# Patient Record
Sex: MALE | Race: WHITE | NOT HISPANIC OR LATINO | Employment: OTHER | URBAN - METROPOLITAN AREA
[De-identification: names, ages, dates, MRNs, and addresses within clinical notes are randomized per-mention and may not be internally consistent; named-entity substitution may affect disease eponyms.]

---

## 2021-03-03 DIAGNOSIS — Z23 NEED FOR VACCINATION: ICD-10-CM

## 2021-03-30 ENCOUNTER — HOSPITAL ENCOUNTER (OUTPATIENT)
Dept: LAB | Facility: MEDICAL CENTER | Age: 66
End: 2021-03-30
Payer: MEDICARE

## 2021-03-30 LAB — PSA SERPL-MCNC: <0.02 NG/ML (ref 0–4)

## 2021-03-30 PROCEDURE — 84153 ASSAY OF PSA TOTAL: CPT

## 2021-03-30 PROCEDURE — 36415 COLL VENOUS BLD VENIPUNCTURE: CPT

## 2022-03-07 ENCOUNTER — TELEPHONE (OUTPATIENT)
Dept: SCHEDULING | Facility: IMAGING CENTER | Age: 67
End: 2022-03-07

## 2022-03-16 ENCOUNTER — OFFICE VISIT (OUTPATIENT)
Dept: MEDICAL GROUP | Facility: PHYSICIAN GROUP | Age: 67
End: 2022-03-16
Payer: MEDICARE

## 2022-03-16 VITALS
WEIGHT: 212 LBS | HEART RATE: 83 BPM | HEIGHT: 68 IN | DIASTOLIC BLOOD PRESSURE: 76 MMHG | TEMPERATURE: 97.8 F | RESPIRATION RATE: 18 BRPM | SYSTOLIC BLOOD PRESSURE: 118 MMHG | BODY MASS INDEX: 32.13 KG/M2 | OXYGEN SATURATION: 97 %

## 2022-03-16 DIAGNOSIS — C61 PROSTATE CANCER (HCC): ICD-10-CM

## 2022-03-16 DIAGNOSIS — C61 CARCINOMA OF PROSTATE (HCC): ICD-10-CM

## 2022-03-16 DIAGNOSIS — E78.5 HYPERLIPIDEMIA, UNSPECIFIED HYPERLIPIDEMIA TYPE: ICD-10-CM

## 2022-03-16 DIAGNOSIS — Z00.00 HEALTH CARE MAINTENANCE: ICD-10-CM

## 2022-03-16 DIAGNOSIS — R10.31 RIGHT GROIN PAIN: ICD-10-CM

## 2022-03-16 DIAGNOSIS — E55.9 VITAMIN D DEFICIENCY: ICD-10-CM

## 2022-03-16 DIAGNOSIS — Z12.5 SCREENING FOR PROSTATE CANCER: ICD-10-CM

## 2022-03-16 DIAGNOSIS — K21.9 GASTROESOPHAGEAL REFLUX DISEASE, UNSPECIFIED WHETHER ESOPHAGITIS PRESENT: ICD-10-CM

## 2022-03-16 DIAGNOSIS — D61.82 MYELOPHTHISIC ANEMIA (HCC): ICD-10-CM

## 2022-03-16 DIAGNOSIS — C88.0 WALDENSTROM'S MACROGLOBULINEMIA (HCC): ICD-10-CM

## 2022-03-16 PROBLEM — Z51.12 ENCOUNTER FOR ANTINEOPLASTIC CHEMOTHERAPY AND IMMUNOTHERAPY: Status: ACTIVE | Noted: 2018-11-13

## 2022-03-16 PROBLEM — R00.2 PALPITATIONS: Status: ACTIVE | Noted: 2021-11-18

## 2022-03-16 PROBLEM — Z51.11 ENCOUNTER FOR ANTINEOPLASTIC CHEMOTHERAPY AND IMMUNOTHERAPY: Status: ACTIVE | Noted: 2018-11-13

## 2022-03-16 PROCEDURE — 99204 OFFICE O/P NEW MOD 45 MIN: CPT | Performed by: FAMILY MEDICINE

## 2022-03-16 RX ORDER — VALACYCLOVIR HYDROCHLORIDE 500 MG/1
TABLET, FILM COATED ORAL
COMMUNITY
Start: 2022-03-10 | End: 2024-03-22

## 2022-03-16 RX ORDER — DOXYCYCLINE HYCLATE 50 MG/1
50 TABLET, FILM COATED ORAL DAILY
COMMUNITY
End: 2022-04-27

## 2022-03-16 RX ORDER — IBRUTINIB 420 MG/1
TABLET, FILM COATED ORAL
COMMUNITY
Start: 2020-12-22

## 2022-03-16 RX ORDER — ESOMEPRAZOLE MAGNESIUM 20 MG/1
GRANULE, DELAYED RELEASE ORAL
COMMUNITY
End: 2024-03-22

## 2022-03-16 RX ORDER — CHOLECALCIFEROL (VITAMIN D3) 1250 MCG
CAPSULE ORAL
COMMUNITY
Start: 2022-02-07

## 2022-03-16 ASSESSMENT — PATIENT HEALTH QUESTIONNAIRE - PHQ9: CLINICAL INTERPRETATION OF PHQ2 SCORE: 0

## 2022-03-16 ASSESSMENT — ENCOUNTER SYMPTOMS
HEADACHES: 0
MYALGIAS: 0
VOMITING: 0
PALPITATIONS: 0
CHILLS: 0
FEVER: 0
BLURRED VISION: 0
COUGH: 0
DOUBLE VISION: 0
NAUSEA: 0
DIZZINESS: 0
SHORTNESS OF BREATH: 0
SORE THROAT: 0

## 2022-03-16 NOTE — ASSESSMENT & PLAN NOTE
S/p resection  Declined uro ref, states he monitors the PSA and would like to fax them to his uro in [place]    Dr sadaf quintana   [phone] fax

## 2022-03-16 NOTE — ASSESSMENT & PLAN NOTE
Chronic stable  On ibrutinib  Would like labs sent to Onc in [place]  Onc was dr bird in [place]   [address] [facility] pack   [phone]

## 2022-03-16 NOTE — LETTER
[facility]  Wally Hanson M.D.  [address]  [place] [address]  Fax: [phone]   Authorization for Release/Disclosure of   Protected Health Information   Name: ANABELL FORBES : 1955 SSN: [id number]   Address: [address] [place], [place] [address] [address] Phone:    [phone] (home) [phone] (work)   I authorize the entity listed below to release/disclose the PHI below to:   [facility]/Wally Hanson M.D. and Wally Hanson M.D.   Provider or Entity Name:     Address   City, State, Zip   Phone:      Fax:     Reason for request: continuity of care   Information to be released:    [  ] LAST COLONOSCOPY,  including any PATH REPORT and follow-up  [  ] LAST FIT/COLOGUARD RESULT [  ] LAST DEXA  [  ] LAST MAMMOGRAM  [  ] LAST PAP  [  ] LAST LABS [  ] RETINA EXAM REPORT  [  ] IMMUNIZATION RECORDS  [  ] Release all info      [  ] Check here and initial the line next to each item to release ALL health information INCLUDING  _____ Care and treatment for drug and / or alcohol abuse  _____ HIV testing, infection status, or AIDS  _____ Genetic Testing    DATES OF SERVICE OR TIME PERIOD TO BE DISCLOSED: _____________  I understand and acknowledge that:  * This Authorization may be revoked at any time by you in writing, except if your health information has already been used or disclosed.  * Your health information that will be used or disclosed as a result of you signing this authorization could be re-disclosed by the recipient. If this occurs, your re-disclosed health information may no longer be protected by State or Federal laws.  * You may refuse to sign this Authorization. Your refusal will not affect your ability to obtain treatment.  * This Authorization becomes effective upon signing and will  on (date) __________.      If no date is indicated, this Authorization will  one (1) year from the signature date.    Name: Anabell Forbes    Signature:   Date:     3/16/2022       PLEASE  FAX REQUESTED RECORDS BACK TO: [phone]

## 2022-03-16 NOTE — PROGRESS NOTES
Subjective:     CC:  Diagnoses of Waldenstrom's macroglobulinemia (HCC), Screening for prostate cancer, Health care maintenance, Vitamin D deficiency, Myelophthisic anemia (HCC), Prostate cancer (HCC), Carcinoma of prostate (HCC), Gastroesophageal reflux disease, unspecified whether esophagitis present, Hyperlipidemia, unspecified hyperlipidemia type, and Right groin pain were pertinent to this visit.    HISTORY OF THE PRESENT ILLNESS: Patient is a 66 y.o. male. This pleasant patient is here today to establish care and discuss     1) est care  Need records from prior providers  Onc was dr bird in [place]   [address] [facility] pack   [phone]    [facility]  Can also fax,  Dr sadaf quintana   [phone]    GP  Dr bland [phone] fax    2) waldenstroms on imbruvica    3) statin allergy    Ace allergy  PCN allergy    Using bactrim m/w/f  Taking doxycycline for facial rash    4) R groin/iliopsoas strain, 2 wks  Rides a deana,  No specific injury  Would like to monitor    Problem   Vitamin D Deficiency   Health Care Maintenance   Screening for Prostate Cancer   Right Groin Pain    2 wks  No specific injury  Hot pack tylenol stretching if still concerns rtc or sports med     Palpitations    Last Assessment & Plan:   Formatting of this note might be different from the original.  Intermittent palpitations over last 2 weeks.  Patient describes as being able to feel his pulse in his throat.  He does not clearly no irregularity to the pulse.  He is completely free of exertional symptoms.  EKG is acceptable  I do think he will require cardiology consultation for perhaps  ZIO monitor to rule out arrhythmia.  His symptoms do not sound anginal.  Patient given name of 3 cardiologist in the area     Prostate Cancer (Hcc)    Last Assessment & Plan:   Formatting of this note might be different from the original.  Patient is cancer free of this malignancy     Carcinoma of Prostate (Hcc)    Encounter for Antineoplastic Chemotherapy and Immunotherapy   Myelophthisic Anemia (Hcc)   Hyperviscosity   Neuropathy   Encounter for General Adult Medical Examination Without Abnormal Findings   Gastroesophageal Reflux Disease    Last Assessment & Plan:   Formatting of this note might be different from the original.  Patient requires Nexium only 1-2 days a week  Formatting of this note might be different from the original.  Last Assessment & Plan:    Patient doing well overall with only occasional GERD symptoms.  We will refill his PPI     Hyperlipidemia   Male Erectile Disorder   Waldenstrom's Macroglobulinemia (Hcc)    Last Assessment & Plan:   Formatting of this note might be different from the original.  Patient doing well on current treatment regimen per Oncology         Current Outpatient Medications Ordered in Epic   Medication Sig Dispense Refill   • Ibrutinib (IMBRUVICA) 420 MG Tab Imbruvica 420 mg tablet   TAKE 1 TABLET BY MOUTH ONCE DAILY     • valACYclovir (VALTREX) 500 MG Tab TAKE 1 TABLET BY MOUTH AS DIRECTED     • Doxycycline Hyclate 50 MG Tab Take 50 mg by mouth every day.     • Cholecalciferol (VITAMIN D3) 1.25 MG (08056 UT) Cap TAKE 1 CAPSULE BY MOUTH EVERY WEEK. INDICATIONS: VITAMIN DEFICIENCY     • Esomeprazole Magnesium 20 MG Pack Take  by mouth.       No current Epic-ordered facility-administered medications on file.     ROS:   Review of Systems   Constitutional: Negative for chills and fever.   HENT: Negative for ear pain and sore throat.    Eyes: Negative for blurred vision and double vision.   Respiratory: Negative for cough and shortness of breath.    Cardiovascular: Negative for chest pain and palpitations.   Gastrointestinal: Negative for nausea and vomiting.   Genitourinary: Negative for dysuria and hematuria.   Musculoskeletal: Positive for joint pain. Negative for myalgias.   Neurological: Negative for dizziness and headaches.         Objective:     Exam: /76 (BP Location:  "Left arm, Patient Position: Sitting, BP Cuff Size: Adult)   Pulse 83   Temp 36.6 °C (97.8 °F) (Temporal)   Resp 18   Ht 1.727 m (5' 8\")   Wt 96.2 kg (212 lb)   SpO2 97%  Body mass index is 32.23 kg/m².    Physical Exam  Constitutional:       General: He is not in acute distress.     Appearance: Normal appearance. He is obese. He is not ill-appearing, toxic-appearing or diaphoretic.   HENT:      Head: Normocephalic and atraumatic.   Eyes:      General: No scleral icterus.        Right eye: No discharge.      Extraocular Movements: Extraocular movements intact.      Conjunctiva/sclera: Conjunctivae normal.      Pupils: Pupils are equal, round, and reactive to light.   Cardiovascular:      Rate and Rhythm: Normal rate and regular rhythm.      Pulses: Normal pulses.      Heart sounds: Normal heart sounds. No murmur heard.  Pulmonary:      Effort: Pulmonary effort is normal. No respiratory distress.      Breath sounds: Normal breath sounds.   Abdominal:      General: There is no distension.      Tenderness: There is no abdominal tenderness.   Neurological:      Mental Status: He is alert.      Coordination: Coordination normal.      Gait: Gait normal.         Assessment & Plan:   66 y.o. male with the following -    Problem List Items Addressed This Visit     Carcinoma of prostate (HCC)     S/p resection  Declined uro ref, states he monitors the PSA and would like to fax them to his uro in [place]    Dr sadaf quintana   [phone] fax           Gastroesophageal reflux disease     Chronic stable  No refill needed         Relevant Medications    Esomeprazole Magnesium 20 MG Pack    Hyperlipidemia     Reports muscle aches/allergy to statins  States chol improved with wt loss         Myelophthisic anemia (HCC)    Prostate cancer (HCC)     S/p resection  F/u psa  F/u with uro          Waldenstrom's macroglobulinemia (HCC)     Chronic stable  On ibrutinib  Would like labs sent to Onc in [place]  Onc [place] dr bird [place] "   [address] [facility] pack   [phone]           Vitamin D deficiency     F/u results         Relevant Orders    VITAMIN D,25 HYDROXY    Health care maintenance     Est care  Onc, uro and pmd records needed           Relevant Orders    CBC WITH DIFFERENTIAL    Comp Metabolic Panel    Screening for prostate cancer     F/u results         Relevant Orders    PROSTATE SPECIFIC AG SCREENING    Right groin pain        Return in about 6 months (around 9/16/2022), or if symptoms worsen or fail to improve, for f/u labs.    Please note that this dictation was created using voice recognition software. I have made every reasonable attempt to correct obvious errors, but I expect that there are errors of grammar and possibly content that I did not discover before finalizing the note.

## 2022-03-18 ENCOUNTER — HOSPITAL ENCOUNTER (OUTPATIENT)
Dept: LAB | Facility: MEDICAL CENTER | Age: 67
End: 2022-03-18
Attending: FAMILY MEDICINE
Payer: MEDICARE

## 2022-03-18 DIAGNOSIS — E55.9 VITAMIN D DEFICIENCY: ICD-10-CM

## 2022-03-18 DIAGNOSIS — Z00.00 HEALTH CARE MAINTENANCE: ICD-10-CM

## 2022-03-18 DIAGNOSIS — Z12.5 SCREENING FOR PROSTATE CANCER: ICD-10-CM

## 2022-03-18 LAB
ALBUMIN SERPL BCP-MCNC: 4.5 G/DL (ref 3.2–4.9)
ALBUMIN/GLOB SERPL: 1.9 G/DL
ALP SERPL-CCNC: 67 U/L (ref 30–99)
ALT SERPL-CCNC: 16 U/L (ref 2–50)
ANION GAP SERPL CALC-SCNC: 12 MMOL/L (ref 7–16)
AST SERPL-CCNC: 13 U/L (ref 12–45)
BASOPHILS # BLD AUTO: 0.9 % (ref 0–1.8)
BASOPHILS # BLD: 0.06 K/UL (ref 0–0.12)
BILIRUB SERPL-MCNC: 0.5 MG/DL (ref 0.1–1.5)
BUN SERPL-MCNC: 26 MG/DL (ref 8–22)
CALCIUM SERPL-MCNC: 9.8 MG/DL (ref 8.5–10.5)
CHLORIDE SERPL-SCNC: 102 MMOL/L (ref 96–112)
CO2 SERPL-SCNC: 24 MMOL/L (ref 20–33)
CREAT SERPL-MCNC: 1.01 MG/DL (ref 0.5–1.4)
EOSINOPHIL # BLD AUTO: 0.09 K/UL (ref 0–0.51)
EOSINOPHIL NFR BLD: 1.4 % (ref 0–6.9)
ERYTHROCYTE [DISTWIDTH] IN BLOOD BY AUTOMATED COUNT: 49.1 FL (ref 35.9–50)
GFR SERPLBLD CREATININE-BSD FMLA CKD-EPI: 82 ML/MIN/1.73 M 2
GLOBULIN SER CALC-MCNC: 2.4 G/DL (ref 1.9–3.5)
GLUCOSE SERPL-MCNC: 85 MG/DL (ref 65–99)
HCT VFR BLD AUTO: 47 % (ref 42–52)
HGB BLD-MCNC: 15.3 G/DL (ref 14–18)
IMM GRANULOCYTES # BLD AUTO: 0.06 K/UL (ref 0–0.11)
IMM GRANULOCYTES NFR BLD AUTO: 0.9 % (ref 0–0.9)
LYMPHOCYTES # BLD AUTO: 1.6 K/UL (ref 1–4.8)
LYMPHOCYTES NFR BLD: 24.7 % (ref 22–41)
MCH RBC QN AUTO: 30.5 PG (ref 27–33)
MCHC RBC AUTO-ENTMCNC: 32.6 G/DL (ref 33.7–35.3)
MCV RBC AUTO: 93.8 FL (ref 81.4–97.8)
MONOCYTES # BLD AUTO: 0.6 K/UL (ref 0–0.85)
MONOCYTES NFR BLD AUTO: 9.3 % (ref 0–13.4)
NEUTROPHILS # BLD AUTO: 4.07 K/UL (ref 1.82–7.42)
NEUTROPHILS NFR BLD: 62.8 % (ref 44–72)
NRBC # BLD AUTO: 0 K/UL
NRBC BLD-RTO: 0 /100 WBC
PLATELET # BLD AUTO: 260 K/UL (ref 164–446)
PMV BLD AUTO: 11.8 FL (ref 9–12.9)
POTASSIUM SERPL-SCNC: 4.7 MMOL/L (ref 3.6–5.5)
PROT SERPL-MCNC: 6.9 G/DL (ref 6–8.2)
RBC # BLD AUTO: 5.01 M/UL (ref 4.7–6.1)
SODIUM SERPL-SCNC: 138 MMOL/L (ref 135–145)
WBC # BLD AUTO: 6.5 K/UL (ref 4.8–10.8)

## 2022-03-18 PROCEDURE — 85025 COMPLETE CBC W/AUTO DIFF WBC: CPT

## 2022-03-18 PROCEDURE — 80053 COMPREHEN METABOLIC PANEL: CPT

## 2022-03-18 PROCEDURE — 84153 ASSAY OF PSA TOTAL: CPT | Mod: GA

## 2022-03-18 PROCEDURE — 36415 COLL VENOUS BLD VENIPUNCTURE: CPT

## 2022-03-18 PROCEDURE — 82306 VITAMIN D 25 HYDROXY: CPT

## 2022-03-19 LAB
25(OH)D3 SERPL-MCNC: 67 NG/ML (ref 30–100)
PSA SERPL-MCNC: <0.02 NG/ML (ref 0–4)

## 2022-04-27 ENCOUNTER — OFFICE VISIT (OUTPATIENT)
Dept: MEDICAL GROUP | Facility: PHYSICIAN GROUP | Age: 67
End: 2022-04-27
Payer: MEDICARE

## 2022-04-27 VITALS
BODY MASS INDEX: 32.33 KG/M2 | HEART RATE: 75 BPM | WEIGHT: 213.3 LBS | HEIGHT: 68 IN | DIASTOLIC BLOOD PRESSURE: 70 MMHG | RESPIRATION RATE: 18 BRPM | TEMPERATURE: 97.5 F | OXYGEN SATURATION: 96 % | SYSTOLIC BLOOD PRESSURE: 130 MMHG

## 2022-04-27 DIAGNOSIS — M54.50 ACUTE LEFT-SIDED LOW BACK PAIN WITHOUT SCIATICA: ICD-10-CM

## 2022-04-27 PROCEDURE — 99213 OFFICE O/P EST LOW 20 MIN: CPT | Performed by: FAMILY MEDICINE

## 2022-04-27 RX ORDER — IBUPROFEN 400 MG/1
400 TABLET ORAL EVERY 6 HOURS PRN
Qty: 30 TABLET | Refills: 1 | Status: SHIPPED | OUTPATIENT
Start: 2022-04-27 | End: 2024-03-22

## 2022-04-27 RX ORDER — TIZANIDINE 4 MG/1
2 TABLET ORAL 2 TIMES DAILY
Qty: 30 TABLET | Refills: 1 | Status: SHIPPED | OUTPATIENT
Start: 2022-04-27 | End: 2024-03-22

## 2022-04-27 RX ORDER — SULFAMETHOXAZOLE AND TRIMETHOPRIM 800; 160 MG/1; MG/1
1 TABLET ORAL 2 TIMES DAILY
COMMUNITY

## 2022-04-27 ASSESSMENT — ENCOUNTER SYMPTOMS
VOMITING: 0
HEADACHES: 0
DIZZINESS: 0
DOUBLE VISION: 0
BLURRED VISION: 0
SORE THROAT: 0
COUGH: 0
MYALGIAS: 1
NAUSEA: 0
FEVER: 0
CHILLS: 0
BACK PAIN: 1
PALPITATIONS: 0
SHORTNESS OF BREATH: 0

## 2022-04-27 ASSESSMENT — FIBROSIS 4 INDEX: FIB4 SCORE: .825

## 2022-04-27 NOTE — PROGRESS NOTES
"Subjective:     CC: back muscle spasm    HPI:   Ghanshyam presents today with back spasm  L lower  No injury   x3 days  Has been very active at  Home  But not a specific pop  No urine or neuro sx  No para spinal pain, all L low back  Has to hunch forward and assist to sit up    Using otc    Problem   Acute Left-Sided Low Back Pain Without Sciatica       Current Outpatient Medications Ordered in Epic   Medication Sig Dispense Refill   • sulfamethoxazole-trimethoprim (BACTRIM DS) 800-160 MG tablet Take 1 Tablet by mouth 2 times a day.     • ibuprofen (MOTRIN) 400 MG Tab Take 1 Tablet by mouth every 6 hours as needed for Moderate Pain. 30 Tablet 1   • tizanidine (ZANAFLEX) 4 MG Tab Take 0.5 Tablets by mouth 2 times a day. 30 Tablet 1   • Ibrutinib (IMBRUVICA) 420 MG Tab Imbruvica 420 mg tablet   TAKE 1 TABLET BY MOUTH ONCE DAILY     • valACYclovir (VALTREX) 500 MG Tab TAKE 1 TABLET BY MOUTH AS DIRECTED     • Cholecalciferol (VITAMIN D3) 1.25 MG (20763 UT) Cap TAKE 1 CAPSULE BY MOUTH EVERY WEEK. INDICATIONS: VITAMIN DEFICIENCY     • Esomeprazole Magnesium 20 MG Pack Take  by mouth.       No current Epic-ordered facility-administered medications on file.     ROS:  Review of Systems   Constitutional: Negative for chills and fever.   HENT: Negative for ear pain and sore throat.    Eyes: Negative for blurred vision and double vision.   Respiratory: Negative for cough and shortness of breath.    Cardiovascular: Negative for chest pain and palpitations.   Gastrointestinal: Negative for nausea and vomiting.   Genitourinary: Negative for dysuria and hematuria.   Musculoskeletal: Positive for back pain and myalgias.   Neurological: Negative for dizziness and headaches.       Objective:     Exam:  /70 (BP Location: Left arm, Patient Position: Sitting, BP Cuff Size: Adult)   Pulse 75   Temp 36.4 °C (97.5 °F) (Temporal)   Resp 18   Ht 1.727 m (5' 8\")   Wt 96.8 kg (213 lb 4.8 oz)   SpO2 96%   BMI 32.43 kg/m²  Body mass index " is 32.43 kg/m².    Physical Exam  Constitutional:       General: He is in acute distress.      Appearance: Normal appearance. He is not ill-appearing, toxic-appearing or diaphoretic.   HENT:      Head: Normocephalic and atraumatic.   Musculoskeletal:         General: Tenderness and deformity present. No swelling or signs of injury.      Comments: No paraspinal pain,  Tender to ROM and palptation L low back msk  Hunched forward position    Neurological:      Mental Status: He is alert.         Assessment & Plan:     66 y.o. male with the following -     Problem List Items Addressed This Visit     Acute left-sided low back pain without sciatica     New  Trial of nsaids with good hydration and good  Or use tylenol  Discussed ADRs of msk relaxant  Hot pack, gentle stretch, walking, epsom salt bath, water massage or massage gun  If not resolved  Xray  Monday  F/u with PMR if still not resolved  Pt traveling in 2 wks          Relevant Medications    ibuprofen (MOTRIN) 400 MG Tab    tizanidine (ZANAFLEX) 4 MG Tab    Other Relevant Orders    Referral to Pain Clinic        Return in about 1 week (around 5/4/2022).    Please note that this dictation was created using voice recognition software. I have made every reasonable attempt to correct obvious errors, but I expect that there are errors of grammar and possibly content that I did not discover before finalizing the note.

## 2022-04-27 NOTE — ASSESSMENT & PLAN NOTE
New  Trial of nsaids with good hydration and good  Or use tylenol  Discussed ADRs of msk relaxant  Hot pack, gentle stretch, walking, epsom salt bath, water massage or massage gun  If not resolved  Xray  Monday  F/u with PMR if still not resolved  Pt traveling in 2 wks

## 2022-11-10 ENCOUNTER — PATIENT MESSAGE (OUTPATIENT)
Dept: HEALTH INFORMATION MANAGEMENT | Facility: OTHER | Age: 67
End: 2022-11-10

## 2023-04-17 ENCOUNTER — TELEPHONE (OUTPATIENT)
Dept: HEALTH INFORMATION MANAGEMENT | Facility: OTHER | Age: 68
End: 2023-04-17
Payer: MEDICARE

## 2023-11-15 ENCOUNTER — TELEPHONE (OUTPATIENT)
Dept: HEALTH INFORMATION MANAGEMENT | Facility: OTHER | Age: 68
End: 2023-11-15
Payer: MEDICARE

## 2024-02-27 NOTE — OP THERAPY EVALUATION
Outpatient Physical Therapy  INITIAL NEUROLOGICAL EVALUATION    [facility] [address]  [address]  [place] [address]  Phone:  [phone]  Fax:  [phone]    Date of Evaluation: 2024    Patient: Ghanshyam Forbes  YOB: 1955  MRN: [id number]     Referring Provider: No referring provider defined for this encounter.   Referring Diagnosis Cerebral infarction, unspecified [I63.9];Ataxia, unspecified [R27.0]     Time Calculation    Start time: 1330  Stop time: 1412 Time Calculation (min): 42 minutes             Chief Complaint: Weakness, Difficulty Walking, and Loss Of Balance    Visit Diagnoses     ICD-10-CM   1. Cerebrovascular accident (CVA), unspecified mechanism (HCC)  I63.9   2. Ataxia  R27.0   3. Loss of balance  R26.89       Subjective:   History of Present Illness:     Mechanism of injury:  CVA 23 with inpatient rehab stay      Pt reports stil feeling like he wants to work more on improving his gait/balance. He has to focus a lot on knee ext in stance and notes worse foot drop with fatigue. Feels like he swerves when walking. Downhill on gravel or gravel in general feels he needs to be more aware/careful. Finds himself focusing on gait, counting steps, and he would like walking to be more natural in nature.     Is able to get off the floor.    Everyonce in a while he experiences muscle cramp ie when yawning or standing up.    Last year had Rt TERESITA, planning on left one in the future but currently limited by left hip pain as it has been postponed. Also has low back pain in the morning, improves once up.     Would like to return to riding a motor cycle, skiing (feels limited walking in boots 5# each), picking up left foot in a ski,   Quality of life:  Good  Sleep disturbance:  Not disrupted  Pain:     Current pain ratin  Patient Goals:     Patient goals for therapy:  Increased strength and improved balance      No past medical history on  file.  No past surgical history on file.  Social History     Tobacco Use    Smoking status: Never    Smokeless tobacco: Never   Substance Use Topics    Alcohol use: Not on file     Family and Occupational History     Socioeconomic History    Marital status:      Spouse name: Not on file    Number of children: Not on file    Years of education: Not on file    Highest education level: Not on file   Occupational History    Not on file       Objective:     Observational Gait     Additional Observational Gait Details  During head turns/EC demo in FGA, after Minibest demod sig trendelenburg gait and mod left deviation-likely endurance component     STS when under indirect observation x 4 demod Rt bias in STS with LE positioning.     Balance/Gait Comments   10MWT 8.44  Speed 1.2m/s    MiniBest Test    Anticipatory  STS:2  Rise to Toes:2  SLS: Left 1. 2. 6 Right. 1. 2. 10 score 1  Subscore  5/6    Reactive Postural Control  Compensatory Stepping Correction-Forward:2  Compensatory Stepping Correction-Backwards:2  Compensatory Stepping Correction-Lateral: Left: 2 Right: 2  Subscore 6/6    Sensory Orientation:  Stance (feet together) Eyes open, Firm surface:2  Stance (feet together) Eyes closed, Foam Surface:1  Incline -eyes closed:2  Subscore  5/6    Dynamic Gait  Changes in gait speed:2  Walk with head turns-horizontal:1  Walk with pivot turns:2  Step over obstacles:2  TU.79  Tug CO.12 2    Subscore 9 /10    25/28    Less than 21 indicates increased risk of falling       [facility]  Gait Level surface 2 (6.12)  Change in gait speed 3  Gait with horizontal head turns2  Gait with vertical head turns 3  Gait and pivot turn 3  Step over obstacle 3  Gait with Narrow RADHA 3  Gait EC 1  Ambulating backwards 2  Stairs 2    Total 24/30    Scoring  3. Normal  2. Mild impairment  Moderate impairment   0. Severe Impairment         Therapeutic Exercises (CPT 16487):     1. SL bridge, x 10 B    2. sidelying hip abduction/ext holds,  3 x 1 min      Therapeutic Exercise Summary: Education on POC, findings including type 1 vs type 2 fibers as possible contributions to his complaints of feeling unstable. Cued to focus on STS foot positioning as he consistently bias Rt LE.       Time-based treatments/modalities:    Physical Therapy Timed Treatment Charges  Therapeutic exercise minutes (CPT 25624): 10 minutes      Assessment, Response and Plan:   Impairments: abnormal gait, impaired balance and impaired physical strength    Assessment details:  Ghanshyam presents for PT evaluation with chief complaint of instability with head turns and uneven surfaces as well as gait deviations. Pt scored well on both Mini Best and FGA with some difficulty noted with head turns however were worse with fatigue and much more prominent trendelenburg patter which pt reported a sensation of off balance. Pt would also like to improve automatic qualities of gait as he feels he has to constantly focus on this. He will benefit from skilled PT 1-2x per week x 8 weeks to improve above deficits and maximize function.  Prognosis: good    Goals:   Short Term Goals:   1. Pt will be compliant with HEP for improving strength, endurance and balance 3-5x per week.  2. Pt will demonstrate ability to perform Lt SL balance x 15 seconds or better.  Short term goal time span:  2-4 weeks      Long Term Goals:    1. Pt will demonstrate improved gait pattern with dec attention through use of litegait BWS walking on treadmill performing multiple cog tasks, stepping over items etc x 5 min without LOB.  2. Pt will self report improved gait mechanics with 50% improved automatic gait.   3. Pt will score normal on eyes closed gait on FGA.  Long term goal time span:  6-8 weeks    Plan:   Therapy options:  Physical therapy treatment to continue  Planned therapy interventions:  Neuromuscular Re-education (CPT 86626), Gait Training (CPT 94708), Therapeutic Activities (CPT 05186) and Therapeutic Exercise (CPT  02000)  Frequency:  2x week  Duration in weeks:  8  Discussed with:  Patient  Plan details:  Litegait, stepping over items/reactive gait      Functional Assessment Used      10MWT, Mini Best FGA    Referring provider co-signature:  I have reviewed this plan of care and my co-signature certifies the need for services.    Certification Period: 02/28/2024 to  04/28/24    Physician Signature: ________________________________ Date: ______________

## 2024-02-28 ENCOUNTER — PHYSICAL THERAPY (OUTPATIENT)
Dept: PHYSICAL THERAPY | Facility: REHABILITATION | Age: 69
End: 2024-02-28
Attending: FAMILY MEDICINE
Payer: MEDICARE

## 2024-02-28 DIAGNOSIS — R26.89 LOSS OF BALANCE: ICD-10-CM

## 2024-02-28 DIAGNOSIS — I63.9 CEREBROVASCULAR ACCIDENT (CVA), UNSPECIFIED MECHANISM (HCC): ICD-10-CM

## 2024-02-28 DIAGNOSIS — R27.0 ATAXIA: ICD-10-CM

## 2024-02-28 PROCEDURE — 97162 PT EVAL MOD COMPLEX 30 MIN: CPT

## 2024-02-28 PROCEDURE — 97110 THERAPEUTIC EXERCISES: CPT

## 2024-02-28 SDOH — ECONOMIC STABILITY: GENERAL: QUALITY OF LIFE: GOOD

## 2024-02-28 ASSESSMENT — ACTIVITIES OF DAILY LIVING (ADL): POOR_BALANCE: 1

## 2024-02-28 ASSESSMENT — ENCOUNTER SYMPTOMS: PAIN SCALE: 0

## 2024-03-04 ENCOUNTER — PHYSICAL THERAPY (OUTPATIENT)
Dept: PHYSICAL THERAPY | Facility: REHABILITATION | Age: 69
End: 2024-03-04
Payer: MEDICARE

## 2024-03-04 DIAGNOSIS — I63.9 CEREBROVASCULAR ACCIDENT (CVA), UNSPECIFIED MECHANISM (HCC): ICD-10-CM

## 2024-03-04 DIAGNOSIS — R26.89 LOSS OF BALANCE: ICD-10-CM

## 2024-03-04 PROCEDURE — 97110 THERAPEUTIC EXERCISES: CPT

## 2024-03-04 PROCEDURE — 97112 NEUROMUSCULAR REEDUCATION: CPT

## 2024-03-04 NOTE — OP THERAPY DAILY TREATMENT
Outpatient Physical Therapy  DAILY TREATMENT     [facility] [address].  [address]  [place] [address]  Phone:  [phone]  Fax:  [phone]    Date: 03/04/2024    Patient: Ghanshyam Forbes  YOB: 1955  MRN: [id number]     Time Calculation    Start time: 1000  Stop time: 1043 Time Calculation (min): 43 minutes         Chief Complaint: Loss Of Balance and Weakness    Visit #: 2    SUBJECTIVE:  Sore from shoveling snow    OBJECTIVE:  Current objective measures: minibest 25/28  FGA 24/30          Therapeutic Exercises (CPT 59186):     1. staggard STS, x 10 Lt LE bias    2. quadruped/forearm fire hydrant hold, 3 x 1 min B, education on isometric holds for endu    20. 2/28-4/28      Therapeutic Exercise Summary: HEP: SL bridge 3 x 10, Sidelying hip ab/ext 3 x 1 min    Staggard STS 3 x 10    Therapeutic Treatments and Modalities:     1. Neuromuscular Re-education (CPT 14804)    Therapeutic Treatment and Modalities Summary: Balance HEP  Educated to perform in front of counter top for UE safety  Performed x 1 min each bilateral  Tandem: horizontal head turns, vertical head turns, EC    Single limb stance horizontal and vertical head turns    Time-based treatments/modalities:    Physical Therapy Timed Treatment Charges  Neuromusc re-ed, balance, coor, post minutes (CPT 01319): 15 minutes  Therapeutic exercise minutes (CPT 72648): 28 minutes          ASSESSMENT:   Response to treatment: Progressed HEP for further core and hip isometrics for endurance/decreasing trendelenburg. Provided hand out which included balance portion    PLAN/RECOMMENDATIONS: litegait  Plan for treatment: therapy treatment to continue next visit.  Planned interventions for next visit: continue with current treatment.

## 2024-03-05 ENCOUNTER — HOSPITAL ENCOUNTER (OUTPATIENT)
Facility: MEDICAL CENTER | Age: 69
End: 2024-03-05
Attending: INTERNAL MEDICINE
Payer: MEDICARE

## 2024-03-05 PROCEDURE — 83521 IG LIGHT CHAINS FREE EACH: CPT

## 2024-03-05 PROCEDURE — 84155 ASSAY OF PROTEIN SERUM: CPT

## 2024-03-05 PROCEDURE — 82784 ASSAY IGA/IGD/IGG/IGM EACH: CPT

## 2024-03-05 PROCEDURE — 85810 BLOOD VISCOSITY EXAMINATION: CPT

## 2024-03-05 PROCEDURE — 86334 IMMUNOFIX E-PHORESIS SERUM: CPT

## 2024-03-05 PROCEDURE — 84165 PROTEIN E-PHORESIS SERUM: CPT

## 2024-03-06 ENCOUNTER — APPOINTMENT (OUTPATIENT)
Dept: PHYSICAL THERAPY | Facility: REHABILITATION | Age: 69
End: 2024-03-06
Payer: MEDICARE

## 2024-03-08 ENCOUNTER — APPOINTMENT (OUTPATIENT)
Dept: PHYSICAL THERAPY | Facility: REHABILITATION | Age: 69
End: 2024-03-08
Payer: MEDICARE

## 2024-03-08 LAB
ALBUMIN SERPL ELPH-MCNC: 4.27 G/DL (ref 3.75–5.01)
ALPHA1 GLOB SERPL ELPH-MCNC: 0.26 G/DL (ref 0.19–0.46)
ALPHA2 GLOB SERPL ELPH-MCNC: 0.8 G/DL (ref 0.48–1.05)
B-GLOBULIN SERPL ELPH-MCNC: 0.61 G/DL (ref 0.48–1.1)
EER MONOCLONAL PROTEIN AND FLC, SERUM Q5224: ABNORMAL
GAMMA GLOB SERPL ELPH-MCNC: 2.16 G/DL (ref 0.62–1.51)
IGA SERPL-MCNC: 15 MG/DL (ref 68–408)
IGG SERPL-MCNC: 147 MG/DL (ref 768–1632)
IGM SERPL-MCNC: 3054 MG/DL (ref 35–263)
INTERPRETATION SERPL IFE-IMP: ABNORMAL
INTERPRETATION SERPL IFE-IMP: ABNORMAL
KAPPA LC FREE SER-MCNC: 5.27 MG/L (ref 3.3–19.4)
KAPPA LC FREE/LAMBDA FREE SER NEPH: 1.27 {RATIO} (ref 0.26–1.65)
LAMBDA LC FREE SERPL-MCNC: 4.16 MG/L (ref 5.71–26.3)
MONOCLONAL PROTEIN NL11656: 2.03 G/DL
PROT SERPL-MCNC: 8.1 G/DL (ref 6.3–8.2)

## 2024-03-09 LAB — VISC SER: 1.54 CP

## 2024-03-11 ENCOUNTER — PHYSICAL THERAPY (OUTPATIENT)
Dept: PHYSICAL THERAPY | Facility: REHABILITATION | Age: 69
End: 2024-03-11
Payer: MEDICARE

## 2024-03-11 DIAGNOSIS — R26.89 LOSS OF BALANCE: ICD-10-CM

## 2024-03-11 DIAGNOSIS — I63.9 CEREBROVASCULAR ACCIDENT (CVA), UNSPECIFIED MECHANISM (HCC): ICD-10-CM

## 2024-03-11 PROCEDURE — 97112 NEUROMUSCULAR REEDUCATION: CPT

## 2024-03-11 PROCEDURE — 97110 THERAPEUTIC EXERCISES: CPT

## 2024-03-11 NOTE — OP THERAPY DAILY TREATMENT
Outpatient Physical Therapy  DAILY TREATMENT     [address]  [address]  [place] [address]  Phone:  [phone]  Fax:  [phone]    Date: 03/11/2024    Patient: Ghanshyam Forbes  YOB: 1955  MRN: [id number]     Time Calculation    Start time: 1327  Stop time: 1415 Time Calculation (min): 48 minutes         Chief Complaint: Loss Of Balance and Difficulty Walking    Visit #: 3    SUBJECTIVE:  Pt reports L hip painful in am and after walking ~ 1mile.  Rib seems to be improving.  Doing balance exercises daily.  Reports SLS with eyes closed is very difficult.    OBJECTIVE:        Therapeutic Exercises (CPT 23450):     1. staggered STS, x10 B, with Airex under front foot x10B.    2. quadruped fire hydrant hold, 2 x 1 min B, using handles for wrist neutral position    4. standing hip circles on disc glider, x10B CW/CCW    6. quadruped hip ext pulses, bent knee x30 sec B, straight leg x30 sec B    7. bird dog, x10    20. 2/28-4/28    Therapeutic Treatments and Modalities:     1. Neuromuscular Re-education (CPT 62377)    Therapeutic Treatment and Modalities Summary: Modified SLS with opp foot on Airex, arms front, over and out x30 sec B.  Step taps alt R/L to Airex x1 min.  Clock taps at 10, 12, and 2, x8 CW/CCW B. One LOB required stepping strategy on L stance limb.  Swing through to dynadiscs, heel and toe tap x10B  Gait on tape: zig zag lateral and forward and backward 2x 10 feet all.  Gait lateral steps 2x20 feet.  Gait with exaggerated heel strike 3x 30 feet, retro gait with exaggerated toe toe-heel rocker, 3 x 30 feet.  AP weight shift in stride stance on Airex x30 sec B.  Required one use of UEs for balance.  AP weight shift in stride stance on floor, EC, x10B.      Time-based treatments/modalities:    Physical Therapy Timed Treatment Charges  Neuromusc re-ed, balance, coor, post minutes (CPT 56319): 28 minutes  Therapeutic exercise minutes (CPT  04151): 20 minutes      ASSESSMENT:   Response to treatment: Pt demo good balance strategies and reactions with dynamic balance tasks.  Increased sway and hesitation with eyes closed.  Pt will benefit from continued skilled PT services to improve dynamic balance and LE functional strength for return to leisure activities.    PLAN/RECOMMENDATIONS:   Plan for treatment: therapy treatment to continue next visit.  Planned interventions for next visit: continue with current treatment.

## 2024-03-20 ENCOUNTER — PHYSICAL THERAPY (OUTPATIENT)
Dept: PHYSICAL THERAPY | Facility: REHABILITATION | Age: 69
End: 2024-03-20
Payer: MEDICARE

## 2024-03-20 DIAGNOSIS — I63.9 CEREBROVASCULAR ACCIDENT (CVA), UNSPECIFIED MECHANISM (HCC): ICD-10-CM

## 2024-03-20 DIAGNOSIS — R26.89 LOSS OF BALANCE: ICD-10-CM

## 2024-03-20 PROCEDURE — 97112 NEUROMUSCULAR REEDUCATION: CPT

## 2024-03-20 NOTE — OP THERAPY DAILY TREATMENT
Outpatient Physical Therapy  DAILY TREATMENT     [address]  [address]  [place] [address]  Phone:  [phone]  Fax:  [phone]    Date: 03/20/2024    Patient: Ghanshyam Forbes  YOB: 1955  MRN: [id number]     Time Calculation    Start time: 1130  Stop time: 1211 Time Calculation (min): 41 minutes         Chief Complaint: Loss Of Balance and Difficulty Walking    Visit #: 4    SUBJECTIVE:  Continues to feel miprovement    OBJECTIVE:        Therapeutic Exercises (CPT 29113):     20. 2/28-4/28    Therapeutic Treatments and Modalities:     1. Neuromuscular Re-education (CPT 75528)    Therapeutic Treatment and Modalities Summary: BWSTT on litegait harness for safety to perform dual tasking with high intensity gait training   Incline 5, 2.0mph performing sustain horizontal head turns and vertical head turns x 4 min each while holding conversation., First 3-5 attempts resulting in mod LOB self corrected without use of harness most often left turn  Progressed to incorporate reactive stepping stepping over randomly thrown bean bags on treadmill x 5 min while having conversation 4 mod Lob self corrected  Downward ambulation 5 incline performing ball toss weighted 3# with therapist inside and outside RADHA 1 max A with harness support, 3 mod A self corrected x 5 min  Progressed to self juggle x 2 min lateral, x 2 min vertical    Standing bosu ball ball side down vert/horiiz head turns x 1 min each    Tall kneeling on bosu ball side up toe support x 2 min, progressed to attempts without toe support. 10-15 reps horiz and vert head turns    1/2 kneeling on BOSU toe touch a x 1 min each, no touch a x 1 min each bilateral      Time-based treatments/modalities:    Physical Therapy Timed Treatment Charges  Neuromusc re-ed, balance, coor, post minutes (CPT 68924): 41 minutes      ASSESSMENT:   Response to treatment: Continued to educate on functional balance training  what home ie walking the dog while performing head turns, gait with ball toss, etc. Demod good self corrections during high intensity training without severe LOB. Encouraged OT balance training at gym    PLAN/RECOMMENDATIONS:   Plan for treatment: therapy treatment to continue next visit.  Planned interventions for next visit: continue with current treatment.

## 2024-03-22 ENCOUNTER — OFFICE VISIT (OUTPATIENT)
Dept: MEDICAL GROUP | Facility: PHYSICIAN GROUP | Age: 69
End: 2024-03-22
Payer: MEDICARE

## 2024-03-22 VITALS
HEIGHT: 68 IN | BODY MASS INDEX: 31.58 KG/M2 | DIASTOLIC BLOOD PRESSURE: 70 MMHG | WEIGHT: 208.4 LBS | SYSTOLIC BLOOD PRESSURE: 126 MMHG | TEMPERATURE: 98.4 F | HEART RATE: 77 BPM | OXYGEN SATURATION: 97 %

## 2024-03-22 DIAGNOSIS — Z11.59 NEED FOR HEPATITIS C SCREENING TEST: ICD-10-CM

## 2024-03-22 DIAGNOSIS — C88.0 WALDENSTROM'S MACROGLOBULINEMIA (HCC): ICD-10-CM

## 2024-03-22 DIAGNOSIS — E78.2 MIXED HYPERLIPIDEMIA: ICD-10-CM

## 2024-03-22 DIAGNOSIS — Z86.73 HISTORY OF CVA (CEREBROVASCULAR ACCIDENT): ICD-10-CM

## 2024-03-22 DIAGNOSIS — I48.20 CHRONIC ATRIAL FIBRILLATION (HCC): ICD-10-CM

## 2024-03-22 PROBLEM — T45.1X5A CHEMOTHERAPY-INDUCED NEUTROPENIA (HCC): Status: ACTIVE | Noted: 2023-08-17

## 2024-03-22 PROBLEM — M16.11 PRIMARY OSTEOARTHRITIS OF RIGHT HIP: Status: ACTIVE | Noted: 2023-05-24

## 2024-03-22 PROBLEM — D70.1 CHEMOTHERAPY-INDUCED NEUTROPENIA (HCC): Status: ACTIVE | Noted: 2023-08-17

## 2024-03-22 PROCEDURE — 3078F DIAST BP <80 MM HG: CPT | Performed by: FAMILY MEDICINE

## 2024-03-22 PROCEDURE — 3074F SYST BP LT 130 MM HG: CPT | Performed by: FAMILY MEDICINE

## 2024-03-22 PROCEDURE — 99214 OFFICE O/P EST MOD 30 MIN: CPT | Performed by: FAMILY MEDICINE

## 2024-03-22 RX ORDER — DOXYCYCLINE HYCLATE 50 MG/1
CAPSULE ORAL
COMMUNITY
Start: 2023-10-31

## 2024-03-22 RX ORDER — PREGABALIN 50 MG/1
CAPSULE ORAL
COMMUNITY
End: 2024-03-22

## 2024-03-22 RX ORDER — ACETAMINOPHEN 325 MG/1
650 TABLET ORAL
COMMUNITY
Start: 2023-12-07 | End: 2024-03-22

## 2024-03-22 RX ORDER — ASPIRIN 81 MG/1
TABLET ORAL
COMMUNITY
End: 2024-03-22

## 2024-03-22 RX ORDER — TADALAFIL 20 MG/1
TABLET ORAL
COMMUNITY
Start: 2023-12-26 | End: 2024-03-22

## 2024-03-22 RX ORDER — CLINDAMYCIN HYDROCHLORIDE 300 MG/1
CAPSULE ORAL
COMMUNITY
End: 2024-03-22

## 2024-03-22 RX ORDER — ATORVASTATIN CALCIUM 80 MG/1
TABLET, FILM COATED ORAL
COMMUNITY
Start: 2023-12-30 | End: 2024-03-22

## 2024-03-22 RX ORDER — VALACYCLOVIR HYDROCHLORIDE 500 MG/1
1 TABLET, FILM COATED ORAL DAILY
COMMUNITY
End: 2024-03-22

## 2024-03-22 RX ORDER — SULFAMETHOXAZOLE AND TRIMETHOPRIM 800; 160 MG/1; MG/1
1 TABLET ORAL
COMMUNITY
Start: 2022-12-21 | End: 2024-03-22

## 2024-03-22 RX ORDER — VALACYCLOVIR HYDROCHLORIDE 500 MG/1
500 TABLET, FILM COATED ORAL DAILY
COMMUNITY
Start: 2022-12-20 | End: 2024-03-22

## 2024-03-22 RX ORDER — PANTOPRAZOLE SODIUM 20 MG/1
TABLET, DELAYED RELEASE ORAL
COMMUNITY
Start: 2023-12-08

## 2024-03-22 RX ORDER — CELECOXIB 200 MG/1
CAPSULE ORAL
COMMUNITY
End: 2024-03-22

## 2024-03-22 RX ORDER — APIXABAN 5 MG/1
5 TABLET, FILM COATED ORAL 2 TIMES DAILY
COMMUNITY

## 2024-03-22 RX ORDER — ATORVASTATIN CALCIUM 20 MG/1
20 TABLET, FILM COATED ORAL DAILY
COMMUNITY
Start: 2023-12-29 | End: 2024-06-26

## 2024-03-22 RX ORDER — DOXYCYCLINE HYCLATE 50 MG/1
50 TABLET, FILM COATED ORAL DAILY
COMMUNITY
End: 2024-03-22

## 2024-03-22 RX ORDER — SILDENAFIL 100 MG/1
1 TABLET, FILM COATED ORAL
COMMUNITY
End: 2024-03-22

## 2024-03-22 RX ORDER — TADALAFIL 20 MG/1
20 TABLET ORAL PRN
COMMUNITY
Start: 2024-01-28 | End: 2024-03-22

## 2024-03-22 ASSESSMENT — ENCOUNTER SYMPTOMS
SHORTNESS OF BREATH: 0
PALPITATIONS: 0
NAUSEA: 0
VOMITING: 0
ABDOMINAL PAIN: 0

## 2024-03-22 ASSESSMENT — FIBROSIS 4 INDEX: FIB4 SCORE: 0.98

## 2024-03-22 ASSESSMENT — PATIENT HEALTH QUESTIONNAIRE - PHQ9: CLINICAL INTERPRETATION OF PHQ2 SCORE: 0

## 2024-03-22 NOTE — PROGRESS NOTES
"Subjective:     CC:  Diagnoses of Waldenstrom's macroglobulinemia (HCC), Need for hepatitis C screening test, History of CVA (cerebrovascular accident), Chronic atrial fibrillation (HCC), Primary osteoarthritis of right hip, and Mixed hyperlipidemia were pertinent to this visit.    HISTORY OF THE PRESENT ILLNESS: Patient is a 68 y.o. male. This pleasant patient is here today to establish care and discuss the following. His prior PCP was Dr. Hanson    Problem   History of Cva (Cerebrovascular Accident)    CVA in fall 2023  Is is believed to have been caused by a bout of Afib   Patient continues to work with PT       Chronic Atrial Fibrillation (Hcc)   Chemotherapy-Induced Neutropenia (Hcc)   Primary Osteoarthritis of Right Hip   Hyperlipidemia    Patient is currently on Atorvastatin 20 mg and tolerating it well     Waldenstrom's Macroglobulinemia (Hcc)    Chronic  Managed by Heme/Onc both in [place] and [place]  -Dr. French here in [place]  Notes he will be starting a new medication soon  Is currently on Ibrutinib 420           Health Maintenance: Completed    ROS:   Review of Systems   Respiratory:  Negative for shortness of breath.    Cardiovascular:  Negative for chest pain and palpitations.   Gastrointestinal:  Negative for abdominal pain, nausea and vomiting.         Objective:     Exam: /70 (BP Location: Left arm, Patient Position: Sitting, BP Cuff Size: Adult)   Pulse 77   Temp 36.9 °C (98.4 °F) (Temporal)   Ht 1.727 m (5' 8\")   Wt 94.5 kg (208 lb 6.4 oz)   SpO2 97%  Body mass index is 31.69 kg/m².    Physical Exam  Constitutional:       Appearance: Normal appearance.   HENT:      Head: Normocephalic and atraumatic.      Right Ear: Tympanic membrane, ear canal and external ear normal.      Left Ear: Tympanic membrane, ear canal and external ear normal.      Nose: Nose normal.      Mouth/Throat:      Mouth: Mucous membranes are moist.      Pharynx: Oropharynx is clear.   Eyes:      Extraocular Movements: " Extraocular movements intact.      Conjunctiva/sclera: Conjunctivae normal.      Pupils: Pupils are equal, round, and reactive to light.   Cardiovascular:      Rate and Rhythm: Normal rate and regular rhythm.      Heart sounds: No murmur heard.  Pulmonary:      Effort: Pulmonary effort is normal.      Breath sounds: Normal breath sounds. No wheezing.   Abdominal:      General: Abdomen is flat. Bowel sounds are normal.      Palpations: Abdomen is soft.   Musculoskeletal:      Cervical back: Neck supple.   Skin:     General: Skin is warm and dry.   Neurological:      Mental Status: He is alert and oriented to person, place, and time.   Psychiatric:         Mood and Affect: Mood normal.             Assessment & Plan:   68 y.o. male with the following -    1. Waldenstrom's macroglobulinemia (HCC)    Chronic  Managed by Heme/Onc  Patient to continue Ibrutinib 420 for now    2. Need for hepatitis C screening test  - HCV Scrn ( [id number] 1xLife); Future    During chart review not see that he had ever been screened for hepatitis C, will order today    3. History of CVA (cerebrovascular accident)    Due to A-fib  Patient is to continue his atorvastatin 20 mg daily and Eliquis 5 mg twice a day    4. Chronic atrial fibrillation (HCC)    Patient currently in sinus rhythm  Patient is to continue his Eliquis 5 mg twice a day      5. Mixed hyperlipidemia    Is up-to-date on lab work  Patient is to continue his atorvastatin 20 mg daily      Return in about 1 year (around 3/22/2025) for Medicare AW.    Please note that this dictation was created using voice recognition software. I have made every reasonable attempt to correct obvious errors, but I expect that there are errors of grammar and possibly content that I did not discover before finalizing the note.

## 2024-03-27 ENCOUNTER — PHYSICAL THERAPY (OUTPATIENT)
Dept: PHYSICAL THERAPY | Facility: REHABILITATION | Age: 69
End: 2024-03-27
Payer: MEDICARE

## 2024-03-27 DIAGNOSIS — R27.0 ATAXIA: ICD-10-CM

## 2024-03-27 DIAGNOSIS — R26.89 LOSS OF BALANCE: ICD-10-CM

## 2024-03-27 DIAGNOSIS — I63.9 CEREBROVASCULAR ACCIDENT (CVA), UNSPECIFIED MECHANISM (HCC): ICD-10-CM

## 2024-03-27 PROCEDURE — 97112 NEUROMUSCULAR REEDUCATION: CPT

## 2024-03-27 PROCEDURE — 97110 THERAPEUTIC EXERCISES: CPT

## 2024-03-27 NOTE — OP THERAPY DISCHARGE SUMMARY
Outpatient Physical Therapy  DISCHARGE SUMMARY NOTE      [address]  [address]  [place] [address]  Phone:  [phone]  Fax:  [phone]    Date of Visit: 03/27/2024    Patient: Ghanshyam Forbes  YOB: 1955  MRN: [id number]     Referring Provider: Yann Whittington M.D.   Referring Diagnosis Ataxia, unspecified [R27.0];Cerebral infarction, unspecified [I63.9]         Functional Assessment Used        Your patient is being discharged from Physical Therapy with the following comments:   Goals met    Comments:  Pt has completed 4 sessions since evaluation for balance and gait impairments secondary to CVA. At this time he has met all goals, is independent with HEP with good understanding of progressions and regressions. Pt feels any limitations he still has remaining with gait is related to chronic hip pain in which he plans to have TERESITA in future. Pt would like to DC to HEP. He is appropriate to DC.     Short Term Goals:   1. Pt will be compliant with HEP for improving strength, endurance and balance 3-5x per week.  2. Pt will demonstrate ability to perform Lt SL balance x 15 seconds or better. met  Short term goal time span:  2-4 weeks      Long Term Goals:    1. Pt will demonstrate improved gait pattern with dec attention through use of litegait BWS walking on treadmill performing multiple cog tasks, stepping over items etc x 5 min without LOB. met  2. Pt will self report improved gait mechanics with 50% improved automatic gait. Met  3. Pt will score normal on eyes closed gait on FGA. met    Guero Damon, PT, DPT    Date: 3/27/2024

## 2024-03-27 NOTE — OP THERAPY DAILY TREATMENT
Outpatient Physical Therapy  DAILY TREATMENT     [facility] [address]  [address]  [place] [address]  Phone:  [phone]  Fax:  [phone]    Date: 03/27/2024    Patient: Ghanshyam Forbes  YOB: 1955  MRN: [id number]     Time Calculation    Start time: 1330  Stop time: 1414 Time Calculation (min): 44 minutes         Chief Complaint: Weakness and Loss Of Balance    Visit #: 5    SUBJECTIVE:  Pt reports feeling PLIF in regards to stroke and feels chronic hip pain is most limiting and is planning TERESITA. Feels good to DC    OBJECTIVE:        Gait Level surface 3  Change in gait speed 3  Gait with horizontal head turns 3  Gait with vertical head turns 3  Gait and pivot turn 3  Step over obstacle 3  Gait with Narrow RADHA 2  Gait EC 3  Ambulating backwards 2  Stairs 3    Total 25/30    Scoring  3. Normal  2. Mild impairment  Moderate impairment   0. Severe Impairment     Therapeutic Exercises (CPT 92188):     1. side plank, 2 x 1 min B    2. sideplank clamshell, 2 x 1 min B    3. plank alt LE ext, unable    4. ball bridge, 2 x 2 min    5. sidelying hip ext/ab hold, 3 x 1 min B    6. ball bridge HS curl, 2 x 10    7. goal reassessment    20. 2/28-4/28      Therapeutic Exercise Summary: Access Code: [email or web address]  URL: [email or web address]  Date: 03/27/2024  Prepared by:     Exercises  - Sidelying Hip Extension in Abduction  - 1 x daily - 7 x weekly - 3 reps - 1 min hold  - Side Plank on Knees  - 1 x daily - 7 x weekly - 3-5 reps - 1 min hold  - Modified Side Plank with Hip Abduction  - 1 x daily - 7 x weekly - 3-5 reps - 1min hold  - Modified Side Plank with Hip Abduction and Resistance  - 1 x daily - 7 x weekly - 3-5 reps - 1 min hold  - Bridge with Heels on Swiss Ball  - 1 x daily - 7 x weekly - 3 reps - 4 min hold  - Supine Hamstring Curl on Swiss Ball  - 1 x daily - 7 x weekly - 3 sets - 10 reps    Therapeutic Treatments and Modalities:     1. Neuromuscular  Re-education (CPT 78797)    Therapeutic Treatment and Modalities Summary: Y balance bilateral x 4 min each  Tandem gait on foam airex fwd/bwd with cues to look up and horiz head turns x 3 min  Plank tandem gait x 5 min with head turns  Tandem EC x 2 min  Education on hip/ankle/stepping strategy an importance of cont training for improving hip stabilty before surgery      Time-based treatments/modalities:    Physical Therapy Timed Treatment Charges  Neuromusc re-ed, balance, coor, post minutes (CPT 48075): 17 minutes  Therapeutic exercise minutes (CPT 74480): 27 minutes      ASSESSMENT:   Response to treatment:   Pt has completed 4 sessions since evaluation for balance and gait impairments secondary to CVA. At this time he has met all goals, is independent with HEP with good understanding of progressions and regressions. Pt feels any limitations he still has remaining with gait is related to chronic hip pain in which he plans to have TERESITA in future. Pt would like to DC to HEP. He is appropriate to DC.     Short Term Goals:   1. Pt will be compliant with HEP for improving strength, endurance and balance 3-5x per week.  2. Pt will demonstrate ability to perform Lt SL balance x 15 seconds or better. met  Short term goal time span:  2-4 weeks      Long Term Goals:    1. Pt will demonstrate improved gait pattern with dec attention through use of litegait BWS walking on treadmill performing multiple cog tasks, stepping over items etc x 5 min without LOB. met  2. Pt will self report improved gait mechanics with 50% improved automatic gait. Met  3. Pt will score normal on eyes closed gait on FGA. met    PLAN/RECOMMENDATIONS:   Plan for treatment: therapy treatment to continue next visit.  Planned interventions for next visit: continue with current treatment.

## 2024-04-01 ASSESSMENT — ENCOUNTER SYMPTOMS
ABDOMINAL PAIN: 0
NAUSEA: 0
SHORTNESS OF BREATH: 0
VOMITING: 0
PALPITATIONS: 0

## 2024-04-01 NOTE — PROGRESS NOTES
"Subjective:     CC: Hearing changes    HPI:   Ghanshyam presents today with    Problem   Subjective Hearing Change of Both Ears    Patient states that since his last visit with this provider he had been using water and peroxide to try and soften and remove ear wax in his ears. Notes that he did this about 10 times total. He states that especially in his R ear if felt muffled and full but did not have any pain. Did give him minimal dizziness. States that this is improving.           ROS:  Review of Systems   Respiratory:  Negative for shortness of breath.    Cardiovascular:  Negative for chest pain and palpitations.   Gastrointestinal:  Negative for abdominal pain, nausea and vomiting.       Objective:     Exam:  /74 (BP Location: Right arm, Patient Position: Sitting, BP Cuff Size: Adult)   Pulse 86   Temp 36.8 °C (98.3 °F) (Temporal)   Ht 1.727 m (5' 7.99\")   Wt 95 kg (209 lb 6.4 oz)   SpO2 95%   BMI 31.85 kg/m²  Body mass index is 31.85 kg/m².    Physical Exam  Constitutional:       Appearance: Normal appearance.   HENT:      Head: Normocephalic and atraumatic.      Right Ear: Tympanic membrane, ear canal and external ear normal.      Left Ear: Tympanic membrane, ear canal and external ear normal.      Mouth/Throat:      Mouth: Mucous membranes are moist.   Eyes:      Extraocular Movements: Extraocular movements intact.      Conjunctiva/sclera: Conjunctivae normal.      Pupils: Pupils are equal, round, and reactive to light.   Cardiovascular:      Rate and Rhythm: Normal rate and regular rhythm.      Heart sounds: No murmur heard.  Pulmonary:      Effort: Pulmonary effort is normal.      Breath sounds: Normal breath sounds. No wheezing.   Abdominal:      General: Abdomen is flat. Bowel sounds are normal.      Palpations: Abdomen is soft.   Skin:     General: Skin is warm and dry.   Neurological:      General: No focal deficit present.      Mental Status: He is alert and oriented to person, place, and time. "   Psychiatric:         Mood and Affect: Mood normal.           Assessment & Plan:     68 y.o. male with the following -  1. Subjective hearing change of both ears     Patient's symptoms likely due to his use of the water/peroxide cleaning combination   Reassured patient that his ears were clean and there was no signs of trauma or infection.  Patient can continue to use the water/peroxide mix as needed for ear wax            Return if symptoms worsen or fail to improve.    Please note that this dictation was created using voice recognition software. I have made every reasonable attempt to correct obvious errors, but I expect that there are errors of grammar and possibly content that I did not discover before finalizing the note.

## 2024-04-02 ENCOUNTER — APPOINTMENT (OUTPATIENT)
Dept: MEDICAL GROUP | Facility: PHYSICIAN GROUP | Age: 69
End: 2024-04-02
Payer: MEDICARE

## 2024-04-02 ENCOUNTER — APPOINTMENT (OUTPATIENT)
Dept: PHYSICAL THERAPY | Facility: REHABILITATION | Age: 69
End: 2024-04-02
Payer: MEDICARE

## 2024-04-02 VITALS
HEIGHT: 68 IN | HEART RATE: 86 BPM | DIASTOLIC BLOOD PRESSURE: 74 MMHG | BODY MASS INDEX: 31.74 KG/M2 | TEMPERATURE: 98.3 F | SYSTOLIC BLOOD PRESSURE: 124 MMHG | OXYGEN SATURATION: 95 % | WEIGHT: 209.4 LBS

## 2024-04-02 DIAGNOSIS — H91.93 SUBJECTIVE HEARING CHANGE OF BOTH EARS: ICD-10-CM

## 2024-04-02 PROBLEM — H91.91 SUBJECTIVE HEARING CHANGE OF RIGHT EAR: Status: ACTIVE | Noted: 2024-04-02

## 2024-04-02 PROCEDURE — 3074F SYST BP LT 130 MM HG: CPT | Performed by: FAMILY MEDICINE

## 2024-04-02 PROCEDURE — 3078F DIAST BP <80 MM HG: CPT | Performed by: FAMILY MEDICINE

## 2024-04-02 PROCEDURE — 99213 OFFICE O/P EST LOW 20 MIN: CPT | Performed by: FAMILY MEDICINE

## 2024-04-02 ASSESSMENT — FIBROSIS 4 INDEX: FIB4 SCORE: 0.98

## 2024-04-04 ENCOUNTER — APPOINTMENT (OUTPATIENT)
Dept: PHYSICAL THERAPY | Facility: REHABILITATION | Age: 69
End: 2024-04-04
Payer: MEDICARE

## 2024-04-09 ENCOUNTER — HOSPITAL ENCOUNTER (OUTPATIENT)
Facility: MEDICAL CENTER | Age: 69
End: 2024-04-09
Attending: NURSE PRACTITIONER
Payer: MEDICARE

## 2024-04-09 ENCOUNTER — APPOINTMENT (OUTPATIENT)
Dept: PHYSICAL THERAPY | Facility: REHABILITATION | Age: 69
End: 2024-04-09
Payer: MEDICARE

## 2024-04-09 PROCEDURE — 84155 ASSAY OF PROTEIN SERUM: CPT

## 2024-04-09 PROCEDURE — 82784 ASSAY IGA/IGD/IGG/IGM EACH: CPT

## 2024-04-09 PROCEDURE — 86704 HEP B CORE ANTIBODY TOTAL: CPT | Mod: GA

## 2024-04-09 PROCEDURE — 86706 HEP B SURFACE ANTIBODY: CPT | Mod: GA

## 2024-04-09 PROCEDURE — 87340 HEPATITIS B SURFACE AG IA: CPT | Mod: GA

## 2024-04-09 PROCEDURE — 86803 HEPATITIS C AB TEST: CPT

## 2024-04-09 PROCEDURE — 86334 IMMUNOFIX E-PHORESIS SERUM: CPT

## 2024-04-09 PROCEDURE — 83521 IG LIGHT CHAINS FREE EACH: CPT

## 2024-04-09 PROCEDURE — 84165 PROTEIN E-PHORESIS SERUM: CPT

## 2024-04-09 PROCEDURE — 85810 BLOOD VISCOSITY EXAMINATION: CPT

## 2024-04-10 LAB
HBV CORE AB SERPL QL IA: NONREACTIVE
HBV SURFACE AB SERPL IA-ACNC: <3.5 MIU/ML (ref 0–10)
HBV SURFACE AG SER QL: NORMAL
HCV AB SER QL: NORMAL

## 2024-04-12 ENCOUNTER — APPOINTMENT (OUTPATIENT)
Dept: PHYSICAL THERAPY | Facility: REHABILITATION | Age: 69
End: 2024-04-12
Payer: MEDICARE

## 2024-04-12 LAB
ALBUMIN SERPL ELPH-MCNC: 4.22 G/DL (ref 3.75–5.01)
ALPHA1 GLOB SERPL ELPH-MCNC: 0.27 G/DL (ref 0.19–0.46)
ALPHA2 GLOB SERPL ELPH-MCNC: 0.82 G/DL (ref 0.48–1.05)
B-GLOBULIN SERPL ELPH-MCNC: 0.66 G/DL (ref 0.48–1.1)
EER MONOCLONAL PROTEIN AND FLC, SERUM Q5224: ABNORMAL
GAMMA GLOB SERPL ELPH-MCNC: 2.34 G/DL (ref 0.62–1.51)
IGA SERPL-MCNC: 14 MG/DL (ref 68–408)
IGG SERPL-MCNC: 147 MG/DL (ref 768–1632)
IGM SERPL-MCNC: 3424 MG/DL (ref 35–263)
INTERPRETATION SERPL IFE-IMP: ABNORMAL
INTERPRETATION SERPL IFE-IMP: ABNORMAL
KAPPA LC FREE SER-MCNC: 14.98 MG/L (ref 3.3–19.4)
KAPPA LC FREE/LAMBDA FREE SER NEPH: 3.4 {RATIO} (ref 0.26–1.65)
LAMBDA LC FREE SERPL-MCNC: 4.41 MG/L (ref 5.71–26.3)
MONOCLONAL PROTEIN NL11656: 2.19 G/DL
PROT SERPL-MCNC: 8.3 G/DL (ref 6.3–8.2)
VISC SER: 1.59 CP

## 2024-05-28 ENCOUNTER — HOSPITAL ENCOUNTER (OUTPATIENT)
Facility: MEDICAL CENTER | Age: 69
End: 2024-05-28
Attending: INTERNAL MEDICINE
Payer: MEDICARE

## 2024-05-30 LAB
IGA SERPL-MCNC: 22 MG/DL (ref 68–408)
IGG SERPL-MCNC: 149 MG/DL (ref 768–1632)
IGM SERPL-MCNC: 2345 MG/DL (ref 35–263)

## 2024-05-31 LAB
ALBUMIN SERPL ELPH-MCNC: 4.24 G/DL (ref 3.75–5.01)
ALPHA1 GLOB SERPL ELPH-MCNC: 0.27 G/DL (ref 0.19–0.46)
ALPHA2 GLOB SERPL ELPH-MCNC: 0.77 G/DL (ref 0.48–1.05)
B-GLOBULIN SERPL ELPH-MCNC: 0.66 G/DL (ref 0.48–1.1)
GAMMA GLOB SERPL ELPH-MCNC: 1.46 G/DL (ref 0.62–1.51)
INTERPRETATION SERPL IFE-IMP: ABNORMAL
INTERPRETATION SERPL IFE-IMP: ABNORMAL
MONOCLONAL PROTEIN NL11656: 1.35 G/DL
PATHOLOGY STUDY: ABNORMAL
PROT SERPL-MCNC: 7.4 G/DL (ref 6.3–8.2)
VISC SER: 1.35 CP

## 2024-06-26 ENCOUNTER — APPOINTMENT (OUTPATIENT)
Dept: RADIOLOGY | Facility: MEDICAL CENTER | Age: 69
End: 2024-06-26
Attending: INTERNAL MEDICINE
Payer: MEDICARE

## 2024-07-02 ENCOUNTER — HOSPITAL ENCOUNTER (OUTPATIENT)
Facility: MEDICAL CENTER | Age: 69
End: 2024-07-02
Attending: INTERNAL MEDICINE
Payer: MEDICARE

## 2024-07-02 PROCEDURE — 82784 ASSAY IGA/IGD/IGG/IGM EACH: CPT

## 2024-07-02 PROCEDURE — 85810 BLOOD VISCOSITY EXAMINATION: CPT

## 2024-07-04 LAB
IGA SERPL-MCNC: 11 MG/DL (ref 68–408)
IGG SERPL-MCNC: 145 MG/DL (ref 768–1632)
IGM SERPL-MCNC: 1659 MG/DL (ref 35–263)

## 2024-07-05 LAB — VISC SER: 1.22 CP

## 2024-07-20 ENCOUNTER — HOSPITAL ENCOUNTER (OUTPATIENT)
Dept: RADIOLOGY | Facility: MEDICAL CENTER | Age: 69
End: 2024-07-20
Attending: INTERNAL MEDICINE
Payer: MEDICARE

## 2024-07-20 DIAGNOSIS — Z85.46 PERSONAL HISTORY OF MALIGNANT NEOPLASM OF PROSTATE: ICD-10-CM

## 2024-07-20 DIAGNOSIS — I48.0 PAROXYSMAL ATRIAL FIBRILLATION (HCC): ICD-10-CM

## 2024-07-20 DIAGNOSIS — R19.7 DIARRHEA, UNSPECIFIED TYPE: ICD-10-CM

## 2024-07-20 DIAGNOSIS — C88.0 WALDENSTROM MACROGLOBULINEMIA (HCC): ICD-10-CM

## 2024-07-20 DIAGNOSIS — I63.9 CEREBRAL INFARCTION, UNSPECIFIED MECHANISM (HCC): ICD-10-CM

## 2024-07-20 PROCEDURE — A9579 GAD-BASE MR CONTRAST NOS,1ML: HCPCS | Mod: JZ

## 2024-07-20 PROCEDURE — 70553 MRI BRAIN STEM W/O & W/DYE: CPT

## 2024-07-20 PROCEDURE — 700117 HCHG RX CONTRAST REV CODE 255: Mod: JZ

## 2024-07-20 RX ADMIN — GADOTERIDOL 20 ML: 279.3 INJECTION, SOLUTION INTRAVENOUS at 09:13

## 2025-03-14 ENCOUNTER — HOSPITAL ENCOUNTER (OUTPATIENT)
Facility: MEDICAL CENTER | Age: 70
End: 2025-03-14
Attending: INTERNAL MEDICINE
Payer: MEDICARE

## 2025-03-14 LAB
ALBUMIN SERPL BCP-MCNC: 4.2 G/DL (ref 3.2–4.9)
ALBUMIN/GLOB SERPL: 1.9 G/DL
ALP SERPL-CCNC: 127 U/L (ref 30–99)
ALT SERPL-CCNC: 27 U/L (ref 2–50)
ANION GAP SERPL CALC-SCNC: 11 MMOL/L (ref 7–16)
AST SERPL-CCNC: 19 U/L (ref 12–45)
BILIRUB SERPL-MCNC: 0.3 MG/DL (ref 0.1–1.5)
BUN SERPL-MCNC: 21 MG/DL (ref 8–22)
CALCIUM ALBUM COR SERPL-MCNC: 9.2 MG/DL (ref 8.5–10.5)
CALCIUM SERPL-MCNC: 9.4 MG/DL (ref 8.5–10.5)
CHLORIDE SERPL-SCNC: 105 MMOL/L (ref 96–112)
CO2 SERPL-SCNC: 24 MMOL/L (ref 20–33)
CREAT SERPL-MCNC: 0.98 MG/DL (ref 0.5–1.4)
GFR SERPLBLD CREATININE-BSD FMLA CKD-EPI: 83 ML/MIN/1.73 M 2
GLOBULIN SER CALC-MCNC: 2.2 G/DL (ref 1.9–3.5)
GLUCOSE SERPL-MCNC: 130 MG/DL (ref 65–99)
POTASSIUM SERPL-SCNC: 4.4 MMOL/L (ref 3.6–5.5)
PROT SERPL-MCNC: 6.4 G/DL (ref 6–8.2)
SODIUM SERPL-SCNC: 140 MMOL/L (ref 135–145)

## 2025-03-14 PROCEDURE — 82784 ASSAY IGA/IGD/IGG/IGM EACH: CPT

## 2025-03-14 PROCEDURE — 80053 COMPREHEN METABOLIC PANEL: CPT

## 2025-03-14 PROCEDURE — 85810 BLOOD VISCOSITY EXAMINATION: CPT

## 2025-03-16 LAB
IGA SERPL-MCNC: 23 MG/DL (ref 68–408)
IGG SERPL-MCNC: 135 MG/DL (ref 768–1632)
IGM SERPL-MCNC: 833 MG/DL (ref 35–263)

## 2025-03-19 LAB — VISC SER: 1.14 CP

## 2025-04-18 ENCOUNTER — HOSPITAL ENCOUNTER (OUTPATIENT)
Dept: LAB | Facility: MEDICAL CENTER | Age: 70
End: 2025-04-18
Attending: INTERNAL MEDICINE
Payer: MEDICARE

## 2025-04-18 LAB — 25(OH)D3 SERPL-MCNC: 52 NG/ML (ref 30–100)

## 2025-04-18 PROCEDURE — 82306 VITAMIN D 25 HYDROXY: CPT

## 2025-04-18 PROCEDURE — 36415 COLL VENOUS BLD VENIPUNCTURE: CPT

## 2025-06-03 ENCOUNTER — APPOINTMENT (OUTPATIENT)
Dept: MEDICAL GROUP | Facility: PHYSICIAN GROUP | Age: 70
End: 2025-06-03
Payer: MEDICARE

## 2025-06-03 VITALS
SYSTOLIC BLOOD PRESSURE: 122 MMHG | BODY MASS INDEX: 31.34 KG/M2 | HEIGHT: 68 IN | WEIGHT: 206.8 LBS | DIASTOLIC BLOOD PRESSURE: 76 MMHG | OXYGEN SATURATION: 96 % | TEMPERATURE: 97.1 F | HEART RATE: 71 BPM | RESPIRATION RATE: 16 BRPM

## 2025-06-03 DIAGNOSIS — E55.9 VITAMIN D DEFICIENCY: ICD-10-CM

## 2025-06-03 DIAGNOSIS — L29.9 PRURITUS: ICD-10-CM

## 2025-06-03 DIAGNOSIS — M16.12 PRIMARY OSTEOARTHRITIS OF LEFT HIP: ICD-10-CM

## 2025-06-03 DIAGNOSIS — I48.0 PAROXYSMAL ATRIAL FIBRILLATION (HCC): ICD-10-CM

## 2025-06-03 DIAGNOSIS — C88.00 WALDENSTROM'S MACROGLOBULINEMIA (HCC): ICD-10-CM

## 2025-06-03 DIAGNOSIS — D61.82 MYELOPHTHISIC ANEMIA (HCC): ICD-10-CM

## 2025-06-03 DIAGNOSIS — Z86.73 HISTORY OF CVA (CEREBROVASCULAR ACCIDENT): ICD-10-CM

## 2025-06-03 DIAGNOSIS — M65.331 TRIGGER MIDDLE FINGER OF RIGHT HAND: ICD-10-CM

## 2025-06-03 DIAGNOSIS — Z96.649 PERIPROSTHETIC FRACTURE OF HIP, SUBSEQUENT ENCOUNTER: ICD-10-CM

## 2025-06-03 DIAGNOSIS — M97.8XXD PERIPROSTHETIC FRACTURE OF HIP, SUBSEQUENT ENCOUNTER: ICD-10-CM

## 2025-06-03 DIAGNOSIS — L71.9 ROSACEA: ICD-10-CM

## 2025-06-03 DIAGNOSIS — Z00.00 ENCOUNTER FOR MEDICARE ANNUAL WELLNESS EXAM: Primary | ICD-10-CM

## 2025-06-03 DIAGNOSIS — M16.11 PRIMARY OSTEOARTHRITIS OF RIGHT HIP: ICD-10-CM

## 2025-06-03 DIAGNOSIS — K21.9 GASTROESOPHAGEAL REFLUX DISEASE, UNSPECIFIED WHETHER ESOPHAGITIS PRESENT: ICD-10-CM

## 2025-06-03 PROCEDURE — 3078F DIAST BP <80 MM HG: CPT | Performed by: FAMILY MEDICINE

## 2025-06-03 PROCEDURE — 3074F SYST BP LT 130 MM HG: CPT | Performed by: FAMILY MEDICINE

## 2025-06-03 PROCEDURE — G0439 PPPS, SUBSEQ VISIT: HCPCS | Performed by: FAMILY MEDICINE

## 2025-06-03 RX ORDER — SULFAMETHOXAZOLE AND TRIMETHOPRIM 800; 160 MG/1; MG/1
1 TABLET ORAL
COMMUNITY
Start: 2024-08-23 | End: 2025-07-25

## 2025-06-03 RX ORDER — PANTOPRAZOLE SODIUM 20 MG/1
20 TABLET, DELAYED RELEASE ORAL DAILY
Qty: 90 TABLET | Refills: 3 | Status: SHIPPED | OUTPATIENT
Start: 2025-06-03

## 2025-06-03 RX ORDER — VENETOCLAX 100 MG/1
TABLET, FILM COATED ORAL
COMMUNITY

## 2025-06-03 RX ORDER — VALACYCLOVIR HYDROCHLORIDE 500 MG/1
500 TABLET, FILM COATED ORAL
COMMUNITY

## 2025-06-03 RX ORDER — ATORVASTATIN CALCIUM 20 MG/1
1 TABLET, FILM COATED ORAL
COMMUNITY

## 2025-06-03 RX ORDER — CHOLECALCIFEROL (VITAMIN D3) 1250 MCG
50000 CAPSULE ORAL
Qty: 12 CAPSULE | Refills: 3 | Status: SHIPPED | OUTPATIENT
Start: 2025-06-03

## 2025-06-03 ASSESSMENT — ENCOUNTER SYMPTOMS: GENERAL WELL-BEING: GOOD

## 2025-06-03 ASSESSMENT — PATIENT HEALTH QUESTIONNAIRE - PHQ9: CLINICAL INTERPRETATION OF PHQ2 SCORE: 0

## 2025-06-03 ASSESSMENT — FIBROSIS 4 INDEX: FIB4 SCORE: 1.46

## 2025-06-03 ASSESSMENT — ACTIVITIES OF DAILY LIVING (ADL): BATHING_REQUIRES_ASSISTANCE: 0
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
## 2025-06-04 ENCOUNTER — HOSPITAL ENCOUNTER (OUTPATIENT)
Facility: MEDICAL CENTER | Age: 70
End: 2025-06-04
Attending: INTERNAL MEDICINE
Payer: MEDICARE

## 2025-06-04 PROCEDURE — 82784 ASSAY IGA/IGD/IGG/IGM EACH: CPT

## 2025-06-04 PROCEDURE — 85810 BLOOD VISCOSITY EXAMINATION: CPT

## 2025-06-06 LAB
IGA SERPL-MCNC: 24 MG/DL (ref 68–408)
IGG SERPL-MCNC: 159 MG/DL (ref 768–1632)
IGM SERPL-MCNC: 1005 MG/DL (ref 35–263)

## 2025-06-08 LAB — VISC SER: 1.17 CP

## 2025-06-12 NOTE — Clinical Note
REFERRAL APPROVAL NOTICE         Sent on June 12, 2025                   Ghanshyam Forbes  [address]  [place] [address]                   Dear Mr. Forbes,    After a careful review of the medical information and benefit coverage, [facility] has processed your referral. See below for additional details.    If applicable, you must be actively enrolled with your insurance for coverage of the authorized service. If you have any questions regarding your coverage, please contact your insurance directly.    REFERRAL INFORMATION   Referral #:  [id number]  Referred-To Provider    Referred-By Provider:  Physical Therapy    Quita You M.D.   [facility]      [address]  [place] [address]  [phone] [id number]  [place] [address]  [phone]    Referral Start Date:  06/03/2025  Referral End Date:   06/03/2026             SCHEDULING  If you do not already have an appointment, please call [phone] to make an appointment.     MORE INFORMATION  If you do not already have a [employer] account, sign up at: [email or web address]  You can access your medical information, make appointments, see lab results, billing information, and more.  If you have questions regarding this referral, please contact  the [facility] Referrals department at:             [phone]. Monday - Friday 8:00AM - 5:00PM.     Sincerely,    [facility]

## 2025-06-12 NOTE — Clinical Note
REFERRAL APPROVAL NOTICE         Sent on June 12, 2025                   Ghanshyam Forbes  [address]  [place] [address]                   Dear Mr. Forbes,    After a careful review of the medical information and benefit coverage, [facility] has processed your referral. See below for additional details.    If applicable, you must be actively enrolled with your insurance for coverage of the authorized service. If you have any questions regarding your coverage, please contact your insurance directly.    REFERRAL INFORMATION   Referral #:  [id number]  Referred-To Department    Referred-By Provider:  Orthopedics    Quita You M.D.   Department Of Surgery      [address]  [place] [address]  [phone] [address], [address]  [place] [id number] [phone]    Referral Start Date:  06/03/2025  Referral End Date:   06/03/2026             SCHEDULING  If you do not already have an appointment, please call [phone] to make an appointment.     MORE INFORMATION  If you do not already have a [employer] account, sign up at: [email or web address]  You can access your medical information, make appointments, see lab results, billing information, and more.  If you have questions regarding this referral, please contact  the [facility] Referrals department at:             [phone]. Monday - Friday 8:00AM - 5:00PM.     Sincerely,    [facility]

## 2025-08-15 ENCOUNTER — HOSPITAL ENCOUNTER (OUTPATIENT)
Facility: MEDICAL CENTER | Age: 70
End: 2025-08-15
Attending: INTERNAL MEDICINE
Payer: MEDICARE

## 2025-08-15 LAB
ALBUMIN SERPL BCP-MCNC: 4.2 G/DL (ref 3.2–4.9)
ALBUMIN/GLOB SERPL: 1.8 G/DL
ALP SERPL-CCNC: 127 U/L (ref 30–99)
ALT SERPL-CCNC: 28 U/L (ref 2–50)
ANION GAP SERPL CALC-SCNC: 12 MMOL/L (ref 7–16)
AST SERPL-CCNC: 23 U/L (ref 12–45)
BILIRUB SERPL-MCNC: 0.5 MG/DL (ref 0.1–1.5)
BUN SERPL-MCNC: 20 MG/DL (ref 8–22)
CALCIUM ALBUM COR SERPL-MCNC: 9.5 MG/DL (ref 8.5–10.5)
CALCIUM SERPL-MCNC: 9.7 MG/DL (ref 8.5–10.5)
CHLORIDE SERPL-SCNC: 102 MMOL/L (ref 96–112)
CO2 SERPL-SCNC: 24 MMOL/L (ref 20–33)
CREAT SERPL-MCNC: 1 MG/DL (ref 0.5–1.4)
GFR SERPLBLD CREATININE-BSD FMLA CKD-EPI: 81 ML/MIN/1.73 M 2
GLOBULIN SER CALC-MCNC: 2.3 G/DL (ref 1.9–3.5)
GLUCOSE SERPL-MCNC: 126 MG/DL (ref 65–99)
POTASSIUM SERPL-SCNC: 4.1 MMOL/L (ref 3.6–5.5)
PROT SERPL-MCNC: 6.5 G/DL (ref 6–8.2)
SODIUM SERPL-SCNC: 138 MMOL/L (ref 135–145)

## 2025-08-15 PROCEDURE — 82784 ASSAY IGA/IGD/IGG/IGM EACH: CPT

## 2025-08-15 PROCEDURE — 85810 BLOOD VISCOSITY EXAMINATION: CPT

## 2025-08-15 PROCEDURE — 80053 COMPREHEN METABOLIC PANEL: CPT

## 2025-08-18 LAB
IGA SERPL-MCNC: 13 MG/DL (ref 68–408)
IGG SERPL-MCNC: 146 MG/DL (ref 768–1632)
IGM SERPL-MCNC: 701 MG/DL (ref 35–263)

## 2025-08-20 LAB — VISC SER: 1.14 CP

## 2025-08-26 ENCOUNTER — HOSPITAL ENCOUNTER (OUTPATIENT)
Dept: RADIOLOGY | Facility: MEDICAL CENTER | Age: 70
End: 2025-08-26
Attending: FAMILY MEDICINE
Payer: MEDICARE

## 2025-08-26 DIAGNOSIS — M97.8XXD PERIPROSTHETIC FRACTURE OF HIP, SUBSEQUENT ENCOUNTER: ICD-10-CM

## 2025-08-26 DIAGNOSIS — Z96.649 PERIPROSTHETIC FRACTURE OF HIP, SUBSEQUENT ENCOUNTER: ICD-10-CM

## 2025-08-26 DIAGNOSIS — M16.12 PRIMARY OSTEOARTHRITIS OF LEFT HIP: ICD-10-CM

## 2025-08-26 PROCEDURE — 73721 MRI JNT OF LWR EXTRE W/O DYE: CPT
